# Patient Record
Sex: MALE | Race: WHITE | Employment: UNEMPLOYED | URBAN - METROPOLITAN AREA
[De-identification: names, ages, dates, MRNs, and addresses within clinical notes are randomized per-mention and may not be internally consistent; named-entity substitution may affect disease eponyms.]

---

## 2019-08-13 ENCOUNTER — APPOINTMENT (OUTPATIENT)
Dept: CT IMAGING | Age: 79
DRG: 083 | End: 2019-08-13
Payer: MEDICARE

## 2019-08-13 ENCOUNTER — HOSPITAL ENCOUNTER (INPATIENT)
Age: 79
LOS: 2 days | Discharge: HOME | DRG: 083 | End: 2019-08-15
Attending: EMERGENCY MEDICINE | Admitting: SURGERY
Payer: MEDICARE

## 2019-08-13 DIAGNOSIS — I60.9 SUBARACHNOID BLEED (HCC): Primary | ICD-10-CM

## 2019-08-13 LAB
ABO/RH: NORMAL
ALLEN TEST: ABNORMAL
ALLEN TEST: NORMAL
ANION GAP SERPL CALCULATED.3IONS-SCNC: 16 MMOL/L (ref 9–17)
ANTIBODY SCREEN: NEGATIVE
ARM BAND NUMBER: NORMAL
BLOOD BANK SPECIMEN: ABNORMAL
BUN BLDV-MCNC: 19 MG/DL (ref 8–23)
CARBOXYHEMOGLOBIN: 1.2 % (ref 0–5)
CARBOXYHEMOGLOBIN: ABNORMAL %
CHLORIDE BLD-SCNC: 102 MMOL/L (ref 98–107)
CO2: 24 MMOL/L (ref 20–31)
CREAT SERPL-MCNC: 1.08 MG/DL (ref 0.7–1.2)
ETHANOL PERCENT: <0.01 %
ETHANOL: <10 MG/DL
EXPIRATION DATE: NORMAL
FIO2: ABNORMAL
FIO2: NORMAL
GFR AFRICAN AMERICAN: >60 ML/MIN
GFR NON-AFRICAN AMERICAN: >60 ML/MIN
GFR SERPL CREATININE-BSD FRML MDRD: ABNORMAL ML/MIN/{1.73_M2}
GFR SERPL CREATININE-BSD FRML MDRD: ABNORMAL ML/MIN/{1.73_M2}
GLUCOSE BLD-MCNC: 97 MG/DL (ref 70–99)
HCG QUALITATIVE: ABNORMAL
HCO3 VENOUS: 24 MMOL/L (ref 24–30)
HCO3 VENOUS: ABNORMAL MMOL/L (ref 24–30)
HCT VFR BLD CALC: 36 % (ref 40.7–50.3)
HEMOGLOBIN: 11.6 G/DL (ref 13–17)
INR BLD: 0.9
MCH RBC QN AUTO: 32 PG (ref 25.2–33.5)
MCHC RBC AUTO-ENTMCNC: 32.2 G/DL (ref 28.4–34.8)
MCV RBC AUTO: 99.2 FL (ref 82.6–102.9)
METHEMOGLOBIN: ABNORMAL %
METHEMOGLOBIN: NORMAL % (ref 0–1.5)
MODE: ABNORMAL
MODE: NORMAL
NEGATIVE BASE EXCESS, VEN: 0.8 MMOL/L (ref 0–2)
NEGATIVE BASE EXCESS, VEN: ABNORMAL MMOL/L (ref 0–2)
NOTIFICATION TIME: ABNORMAL
NOTIFICATION TIME: NORMAL
NOTIFICATION: ABNORMAL
NOTIFICATION: NORMAL
NRBC AUTOMATED: 0 PER 100 WBC
O2 DEVICE/FLOW/%: ABNORMAL
O2 DEVICE/FLOW/%: NORMAL
O2 SAT, VEN: 78.3 % (ref 60–85)
O2 SAT, VEN: ABNORMAL %
OXYHEMOGLOBIN: ABNORMAL % (ref 95–98)
OXYHEMOGLOBIN: NORMAL % (ref 95–98)
PARTIAL THROMBOPLASTIN TIME: 22.5 SEC (ref 20.5–30.5)
PATIENT TEMP: 37
PATIENT TEMP: ABNORMAL
PCO2, VEN, TEMP ADJ: ABNORMAL MMHG (ref 39–55)
PCO2, VEN, TEMP ADJ: NORMAL MMHG (ref 39–55)
PCO2, VEN: 42.7 (ref 39–55)
PCO2, VEN: ABNORMAL (ref 39–55)
PDW BLD-RTO: 15 % (ref 11.8–14.4)
PEEP/CPAP: ABNORMAL
PEEP/CPAP: NORMAL
PH VENOUS: 7.37 (ref 7.32–7.42)
PH VENOUS: ABNORMAL (ref 7.32–7.42)
PH, VEN, TEMP ADJ: ABNORMAL (ref 7.32–7.42)
PH, VEN, TEMP ADJ: NORMAL (ref 7.32–7.42)
PLATELET # BLD: 186 K/UL (ref 138–453)
PMV BLD AUTO: 9.6 FL (ref 8.1–13.5)
PO2, VEN, TEMP ADJ: ABNORMAL MMHG (ref 30–50)
PO2, VEN, TEMP ADJ: NORMAL MMHG (ref 30–50)
PO2, VEN: 42.9 (ref 30–50)
PO2, VEN: ABNORMAL (ref 30–50)
POSITIVE BASE EXCESS, VEN: ABNORMAL MMOL/L (ref 0–2)
POSITIVE BASE EXCESS, VEN: NORMAL MMOL/L (ref 0–2)
POTASSIUM SERPL-SCNC: 4.4 MMOL/L (ref 3.7–5.3)
PROTHROMBIN TIME: 10.1 SEC (ref 9–12)
PSV: ABNORMAL
PSV: NORMAL
PT. POSITION: ABNORMAL
PT. POSITION: NORMAL
RBC # BLD: 3.63 M/UL (ref 4.21–5.77)
RESPIRATORY RATE: ABNORMAL
RESPIRATORY RATE: NORMAL
SAMPLE SITE: ABNORMAL
SAMPLE SITE: NORMAL
SET RATE: ABNORMAL
SET RATE: NORMAL
SODIUM BLD-SCNC: 142 MMOL/L (ref 135–144)
TEXT FOR RESPIRATORY: ABNORMAL
TEXT FOR RESPIRATORY: NORMAL
TOTAL HB: ABNORMAL G/DL (ref 12–16)
TOTAL HB: NORMAL G/DL (ref 12–16)
TOTAL RATE: ABNORMAL
TOTAL RATE: NORMAL
VT: ABNORMAL
VT: NORMAL
WBC # BLD: 5.8 K/UL (ref 3.5–11.3)

## 2019-08-13 PROCEDURE — 86900 BLOOD TYPING SEROLOGIC ABO: CPT

## 2019-08-13 PROCEDURE — 6370000000 HC RX 637 (ALT 250 FOR IP): Performed by: STUDENT IN AN ORGANIZED HEALTH CARE EDUCATION/TRAINING PROGRAM

## 2019-08-13 PROCEDURE — 82805 BLOOD GASES W/O2 SATURATION: CPT

## 2019-08-13 PROCEDURE — 82947 ASSAY GLUCOSE BLOOD QUANT: CPT

## 2019-08-13 PROCEDURE — 80051 ELECTROLYTE PANEL: CPT

## 2019-08-13 PROCEDURE — 99285 EMERGENCY DEPT VISIT HI MDM: CPT

## 2019-08-13 PROCEDURE — 85610 PROTHROMBIN TIME: CPT

## 2019-08-13 PROCEDURE — 85576 BLOOD PLATELET AGGREGATION: CPT

## 2019-08-13 PROCEDURE — 84520 ASSAY OF UREA NITROGEN: CPT

## 2019-08-13 PROCEDURE — 86850 RBC ANTIBODY SCREEN: CPT

## 2019-08-13 PROCEDURE — 87641 MR-STAPH DNA AMP PROBE: CPT

## 2019-08-13 PROCEDURE — 93005 ELECTROCARDIOGRAM TRACING: CPT | Performed by: STUDENT IN AN ORGANIZED HEALTH CARE EDUCATION/TRAINING PROGRAM

## 2019-08-13 PROCEDURE — 85027 COMPLETE CBC AUTOMATED: CPT

## 2019-08-13 PROCEDURE — 86901 BLOOD TYPING SEROLOGIC RH(D): CPT

## 2019-08-13 PROCEDURE — 36415 COLL VENOUS BLD VENIPUNCTURE: CPT

## 2019-08-13 PROCEDURE — 2580000003 HC RX 258: Performed by: STUDENT IN AN ORGANIZED HEALTH CARE EDUCATION/TRAINING PROGRAM

## 2019-08-13 PROCEDURE — 70496 CT ANGIOGRAPHY HEAD: CPT

## 2019-08-13 PROCEDURE — 84703 CHORIONIC GONADOTROPIN ASSAY: CPT

## 2019-08-13 PROCEDURE — 85210 CLOT FACTOR II PROTHROM SPEC: CPT

## 2019-08-13 PROCEDURE — 82565 ASSAY OF CREATININE: CPT

## 2019-08-13 PROCEDURE — G0480 DRUG TEST DEF 1-7 CLASSES: HCPCS

## 2019-08-13 PROCEDURE — 6360000004 HC RX CONTRAST MEDICATION: Performed by: SURGERY

## 2019-08-13 PROCEDURE — 85390 FIBRINOLYSINS SCREEN I&R: CPT

## 2019-08-13 PROCEDURE — 2000000000 HC ICU R&B

## 2019-08-13 PROCEDURE — 85730 THROMBOPLASTIN TIME PARTIAL: CPT

## 2019-08-13 RX ORDER — POLYETHYLENE GLYCOL 3350 17 G/17G
17 POWDER, FOR SOLUTION ORAL DAILY
Status: DISCONTINUED | OUTPATIENT
Start: 2019-08-14 | End: 2019-08-15 | Stop reason: HOSPADM

## 2019-08-13 RX ORDER — ACETAMINOPHEN 500 MG
1000 TABLET ORAL EVERY 8 HOURS SCHEDULED
Status: DISCONTINUED | OUTPATIENT
Start: 2019-08-13 | End: 2019-08-15 | Stop reason: HOSPADM

## 2019-08-13 RX ORDER — SODIUM CHLORIDE 9 MG/ML
INJECTION, SOLUTION INTRAVENOUS CONTINUOUS
Status: DISCONTINUED | OUTPATIENT
Start: 2019-08-13 | End: 2019-08-14

## 2019-08-13 RX ORDER — LABETALOL HYDROCHLORIDE 5 MG/ML
10 INJECTION, SOLUTION INTRAVENOUS EVERY 6 HOURS PRN
Status: DISCONTINUED | OUTPATIENT
Start: 2019-08-13 | End: 2019-08-14

## 2019-08-13 RX ORDER — FENTANYL CITRATE 50 UG/ML
25 INJECTION, SOLUTION INTRAMUSCULAR; INTRAVENOUS
Status: DISCONTINUED | OUTPATIENT
Start: 2019-08-13 | End: 2019-08-14

## 2019-08-13 RX ORDER — DOCUSATE SODIUM 100 MG/1
100 CAPSULE, LIQUID FILLED ORAL DAILY
Status: DISCONTINUED | OUTPATIENT
Start: 2019-08-14 | End: 2019-08-15 | Stop reason: HOSPADM

## 2019-08-13 RX ORDER — ONDANSETRON 2 MG/ML
4 INJECTION INTRAMUSCULAR; INTRAVENOUS EVERY 6 HOURS PRN
Status: DISCONTINUED | OUTPATIENT
Start: 2019-08-13 | End: 2019-08-14

## 2019-08-13 RX ORDER — SODIUM CHLORIDE 0.9 % (FLUSH) 0.9 %
10 SYRINGE (ML) INJECTION PRN
Status: DISCONTINUED | OUTPATIENT
Start: 2019-08-13 | End: 2019-08-15 | Stop reason: HOSPADM

## 2019-08-13 RX ORDER — METHOCARBAMOL 500 MG/1
500 TABLET, FILM COATED ORAL EVERY 8 HOURS
Status: DISCONTINUED | OUTPATIENT
Start: 2019-08-13 | End: 2019-08-15 | Stop reason: HOSPADM

## 2019-08-13 RX ORDER — OXYCODONE HYDROCHLORIDE 5 MG/1
5 TABLET ORAL EVERY 4 HOURS PRN
Status: DISCONTINUED | OUTPATIENT
Start: 2019-08-13 | End: 2019-08-14

## 2019-08-13 RX ORDER — SODIUM CHLORIDE 0.9 % (FLUSH) 0.9 %
10 SYRINGE (ML) INJECTION EVERY 12 HOURS SCHEDULED
Status: DISCONTINUED | OUTPATIENT
Start: 2019-08-13 | End: 2019-08-15 | Stop reason: HOSPADM

## 2019-08-13 RX ADMIN — ACETAMINOPHEN 1000 MG: 500 TABLET ORAL at 22:28

## 2019-08-13 RX ADMIN — Medication 10 ML: at 22:29

## 2019-08-13 RX ADMIN — SODIUM CHLORIDE: 9 INJECTION, SOLUTION INTRAVENOUS at 22:28

## 2019-08-13 RX ADMIN — IOHEXOL 90 ML: 350 INJECTION, SOLUTION INTRAVENOUS at 20:23

## 2019-08-13 ASSESSMENT — PAIN SCALES - GENERAL
PAINLEVEL_OUTOF10: 0
PAINLEVEL_OUTOF10: 7
PAINLEVEL_OUTOF10: 0

## 2019-08-13 ASSESSMENT — PAIN DESCRIPTION - ORIENTATION: ORIENTATION: ANTERIOR

## 2019-08-13 ASSESSMENT — PAIN DESCRIPTION - PAIN TYPE: TYPE: ACUTE PAIN

## 2019-08-13 ASSESSMENT — PAIN DESCRIPTION - DESCRIPTORS: DESCRIPTORS: ACHING;HEADACHE

## 2019-08-13 ASSESSMENT — PAIN DESCRIPTION - LOCATION: LOCATION: HEAD

## 2019-08-13 NOTE — CONSULTS
shift. Trace edema present. No evidence of hydrocephalus or intraventricular bleed. Pt is neuro intact w/o deficits and is not on AC. Patient care will be discussed with attending, will reevaluate patient along with attending    -Pt denies neck or back pain, no MLT on exam and when rolling pt no obvious deformities or evidence of injury   - No neurosurgical interventions planned for now  - CTLS recommendations: clear Pt has no acute evdience fx on CT c spine, there is a 4 mm posterior subluxation however this is 2/2 deg. Disc disease. Old T3/T4 compression fx. Pt has no MLT of C or T spine, no neuro deficits  - HOB: 30 degrees    - Obtain CT head in AM   -coag, platelet function test   - Neuro checks per protocol  - Hold all antiplatelets and anticoagulants  - We recommend SBP < 160   - Determine the lower limit of SBP clinically based on mentation      Additional recommendations may follow    Please contact neurosurgery with any changes in patient's neurologic status. Thank you for your consult.        Zenia Rain DO    PGY-2 Emergency Medicine Resident Physician  Neurosurgery Team - pager [phone] [facility]  8/13/2019 7:38 PM

## 2019-08-13 NOTE — H&P
TRAUMA HISTORY AND PHYSICAL EXAMINATION  (V 2.0)    PATIENT NAME: Luis Armando Ford  YOB: 1940  MEDICAL RECORD NO. [id number]   DATE: 8/13/2019  PRIMARY CARE PHYSICIAN: No primary care provider on file. PATIENT EVALUATED AT THE REQUEST OF :   Keegan Prieto    ACTIVATION   []Trauma Alert     [x] Trauma Priority     []Trauma Consult. IMPRESSION:     Patient Active Problem List   Diagnosis    SAH (subarachnoid hemorrhage) ([facility] [address]     · 78 y.o M s/p fall with [facility]    MEDICAL DECISION MAKING AND PLAN:     · Admit to ICU under Trauma service. 1. Neurological  1. Pain control - tylenol, robaxin, fentanyl PRN  2. CTLS - neurosurgery to clear spine  3. SAH  1. F/u NS recs  2. Cardiovascular  1. Monitor HR and BP  2. Keep BP <160  3. Home meds not resumed yet - need to clarify with pharmacy  3. Respiratory  1. Monitor O2 sat  2. Wean O2 as necessary   4. Gastrointestinal  1. Keep NPO except sips with meds  5. FEN/Renal  1. IVF - NS @ 120cc/hr  2. F/u BMP in AM  6. Heme/ID  1. Afebrile  2. F/u CBC in AM  7. MSK  1. TERT in AM      CONSULT SERVICES    [x] Neurosurgery     [] Orthopedic Surgery    [] Cardiothoracic     [] Facial Trauma    [] Plastic Surgery (Burn)    [] Pediatric Surgery     [] Internal Medicine    [] Pulmonary Medicine    [] Other:       HISTORY:     SOURCE OF INFORMATION  Patient information was obtained from patient. History/Exam limitations: none. INJURY SUMMARY  SAH    GENERAL DATA  Age 78 y.o.  male   Patient information was obtained from patient. History/Exam limitations: none.    Patient presented to the Emergency Department by ambulance   Injury Date: 8/13/2019   Approximate Injury Time: 1800       Transport mode:   [x]Ambulance      [] Helicopter     []Car       [] Other  Referring Hospital: [place] [address], (e.g., home, farm, industry, street)  Specific Details of Location (e.g., bedroom, kitchen, garage): outside of a [address]

## 2019-08-13 NOTE — ED PROVIDER NOTES
[address] [facility]  ED  Emergency Department Encounter  Emergency Medicine Resident     Pt Name: Sarahy Schneider  MRN: [id number]  Juliusgfnicko 1940  Date of evaluation: 8/13/19  PCP:  No primary care provider on file. CHIEF COMPLAINT       Fall      HISTORY OFPRESENT ILLNESS  (Location/Symptom, Timing/Onset, Context/Setting, Quality, Duration, Modifying Factors,Severity.)      Sulaiman Ford is a 78year old male who presents as a transfer from [facility] facility after a fall. The patient's CT imaging showed subarachnoid hemorrhage and the patient was transferred for neurosurgery evaluation. Patient reports that he tripped on a curve and fell backwards hitting his head. The patient believes he lost consciousness but not know for how long. Patient is not currently taking any blood thinners. PAST MEDICAL / SURGICAL / SOCIAL / FAMILY HISTORY      has no past medical history on file. has no past surgical history on file.      Social History     Socioeconomic History    Marital status:      Spouse name: Not on file    Number of children: Not on file    Years of education: Not on file    Highest education level: Not on file   Occupational History    Not on file   Social Needs    Financial resource strain: Not on file    Food insecurity:     Worry: Not on file     Inability: Not on file    Transportation needs:     Medical: Not on file     Non-medical: Not on file   Tobacco Use    Smoking status: Not on file   Substance and Sexual Activity    Alcohol use: Not on file    Drug use: Not on file    Sexual activity: Not on file   Lifestyle    Physical activity:     Days per week: Not on file     Minutes per session: Not on file    Stress: Not on file   Relationships    Social connections:     Talks on phone: Not on file     Gets together: Not on file     Attends Gnosticist service: Not on file     Active member of club or organization: Not on file     Attends meetings of clubs or

## 2019-08-14 ENCOUNTER — APPOINTMENT (OUTPATIENT)
Dept: CT IMAGING | Age: 79
DRG: 083 | End: 2019-08-14
Payer: MEDICARE

## 2019-08-14 ENCOUNTER — APPOINTMENT (OUTPATIENT)
Dept: GENERAL RADIOLOGY | Age: 79
DRG: 083 | End: 2019-08-14
Payer: MEDICARE

## 2019-08-14 LAB
ACT TEG: 113 SEC (ref 86–118)
ANGLE, RAPID TEG: 78.7 DEG (ref 64–80)
ANION GAP SERPL CALCULATED.3IONS-SCNC: 14 MMOL/L (ref 9–17)
BUN BLDV-MCNC: 17 MG/DL (ref 8–23)
BUN/CREAT BLD: NORMAL (ref 9–20)
CALCIUM SERPL-MCNC: 8.6 MG/DL (ref 8.6–10.4)
CHLORIDE BLD-SCNC: 103 MMOL/L (ref 98–107)
CO2: 22 MMOL/L (ref 20–31)
COLLAGEN ADENOSINE-5'-DIPHOSPHATE (ADP) TIME: 97 SEC (ref 67–112)
COLLAGEN EPINEPHRINE TIME: 109 SEC (ref 85–172)
CREAT SERPL-MCNC: 1.11 MG/DL (ref 0.7–1.2)
EKG ATRIAL RATE: 100 BPM
EKG P AXIS: 12 DEGREES
EKG P-R INTERVAL: 134 MS
EKG Q-T INTERVAL: 348 MS
EKG QRS DURATION: 82 MS
EKG QTC CALCULATION (BAZETT): 448 MS
EKG R AXIS: 30 DEGREES
EKG T AXIS: 33 DEGREES
EKG VENTRICULAR RATE: 100 BPM
EPL-TEG: 0 % (ref 0–15)
GFR AFRICAN AMERICAN: >60 ML/MIN
GFR NON-AFRICAN AMERICAN: >60 ML/MIN
GFR SERPL CREATININE-BSD FRML MDRD: NORMAL ML/MIN/{1.73_M2}
GFR SERPL CREATININE-BSD FRML MDRD: NORMAL ML/MIN/{1.73_M2}
GLUCOSE BLD-MCNC: 97 MG/DL (ref 70–99)
HCT VFR BLD CALC: 31.9 % (ref 40.7–50.3)
HEMOGLOBIN: 10.2 G/DL (ref 13–17)
HEPARIN THERAPY: ABNORMAL
KINETICS RAPID TEG: 0.9 MIN (ref 1–2)
LY30 (LYSIS) TEG: 0 % (ref 0–8)
MA(MAX CLOT) RAPID TEG: 69.1 MM (ref 52–71)
MAGNESIUM: 2.3 MG/DL (ref 1.6–2.6)
MCH RBC QN AUTO: 32.5 PG (ref 25.2–33.5)
MCHC RBC AUTO-ENTMCNC: 32 G/DL (ref 28.4–34.8)
MCV RBC AUTO: 101.6 FL (ref 82.6–102.9)
MRSA, DNA, NASAL: NORMAL
NRBC AUTOMATED: 0 PER 100 WBC
PDW BLD-RTO: 15.1 % (ref 11.8–14.4)
PHOSPHORUS: 3.8 MG/DL (ref 2.5–4.5)
PLATELET # BLD: 175 K/UL (ref 138–453)
PLATELET FUNCTION INTERP: NORMAL
PMV BLD AUTO: 9.6 FL (ref 8.1–13.5)
POTASSIUM SERPL-SCNC: 4.1 MMOL/L (ref 3.7–5.3)
RBC # BLD: 3.14 M/UL (ref 4.21–5.77)
REACTION TIME RAPID TEG: 0.7 MIN (ref 0–1)
SODIUM BLD-SCNC: 139 MMOL/L (ref 135–144)
SPECIMEN DESCRIPTION: NORMAL
TEG COMMENT: ABNORMAL
WBC # BLD: 5 K/UL (ref 3.5–11.3)

## 2019-08-14 PROCEDURE — 70450 CT HEAD/BRAIN W/O DYE: CPT

## 2019-08-14 PROCEDURE — 6370000000 HC RX 637 (ALT 250 FOR IP): Performed by: STUDENT IN AN ORGANIZED HEALTH CARE EDUCATION/TRAINING PROGRAM

## 2019-08-14 PROCEDURE — 80048 BASIC METABOLIC PNL TOTAL CA: CPT

## 2019-08-14 PROCEDURE — 84100 ASSAY OF PHOSPHORUS: CPT

## 2019-08-14 PROCEDURE — 97535 SELF CARE MNGMENT TRAINING: CPT

## 2019-08-14 PROCEDURE — 71045 X-RAY EXAM CHEST 1 VIEW: CPT

## 2019-08-14 PROCEDURE — 2580000003 HC RX 258: Performed by: STUDENT IN AN ORGANIZED HEALTH CARE EDUCATION/TRAINING PROGRAM

## 2019-08-14 PROCEDURE — 97166 OT EVAL MOD COMPLEX 45 MIN: CPT

## 2019-08-14 PROCEDURE — 36415 COLL VENOUS BLD VENIPUNCTURE: CPT

## 2019-08-14 PROCEDURE — 83735 ASSAY OF MAGNESIUM: CPT

## 2019-08-14 PROCEDURE — 6370000000 HC RX 637 (ALT 250 FOR IP): Performed by: NURSE PRACTITIONER

## 2019-08-14 PROCEDURE — 92523 SPEECH SOUND LANG COMPREHEN: CPT

## 2019-08-14 PROCEDURE — 6360000002 HC RX W HCPCS: Performed by: STUDENT IN AN ORGANIZED HEALTH CARE EDUCATION/TRAINING PROGRAM

## 2019-08-14 PROCEDURE — 93010 ELECTROCARDIOGRAM REPORT: CPT | Performed by: INTERNAL MEDICINE

## 2019-08-14 PROCEDURE — 97162 PT EVAL MOD COMPLEX 30 MIN: CPT

## 2019-08-14 PROCEDURE — 99221 1ST HOSP IP/OBS SF/LOW 40: CPT | Performed by: NEUROLOGICAL SURGERY

## 2019-08-14 PROCEDURE — 72170 X-RAY EXAM OF PELVIS: CPT

## 2019-08-14 PROCEDURE — 2060000000 HC ICU INTERMEDIATE R&B

## 2019-08-14 PROCEDURE — 97530 THERAPEUTIC ACTIVITIES: CPT

## 2019-08-14 PROCEDURE — 85027 COMPLETE CBC AUTOMATED: CPT

## 2019-08-14 RX ORDER — LATANOPROST 50 UG/ML
1 SOLUTION/ DROPS OPHTHALMIC NIGHTLY
Status: DISCONTINUED | OUTPATIENT
Start: 2019-08-14 | End: 2019-08-15 | Stop reason: HOSPADM

## 2019-08-14 RX ORDER — ALPRAZOLAM 0.5 MG/1
0.5 TABLET ORAL 2 TIMES DAILY PRN
Status: DISCONTINUED | OUTPATIENT
Start: 2019-08-14 | End: 2019-08-15 | Stop reason: HOSPADM

## 2019-08-14 RX ADMIN — Medication 10 ML: at 08:49

## 2019-08-14 RX ADMIN — FENTANYL CITRATE 25 MCG: 50 INJECTION, SOLUTION INTRAMUSCULAR; INTRAVENOUS at 02:16

## 2019-08-14 RX ADMIN — ACETAMINOPHEN 1000 MG: 500 TABLET ORAL at 06:01

## 2019-08-14 RX ADMIN — Medication 10 ML: at 19:50

## 2019-08-14 RX ADMIN — ACETAMINOPHEN 1000 MG: 500 TABLET ORAL at 13:39

## 2019-08-14 RX ADMIN — PAROXETINE HYDROCHLORIDE HEMIHYDRATE 30 MG: 20 TABLET, FILM COATED ORAL at 15:47

## 2019-08-14 RX ADMIN — METHOCARBAMOL TABLETS 500 MG: 500 TABLET, COATED ORAL at 00:02

## 2019-08-14 RX ADMIN — METHOCARBAMOL TABLETS 500 MG: 500 TABLET, COATED ORAL at 23:00

## 2019-08-14 RX ADMIN — LATANOPROST 1 DROP: 50 SOLUTION OPHTHALMIC at 19:50

## 2019-08-14 RX ADMIN — ACETAMINOPHEN 1000 MG: 500 TABLET ORAL at 23:00

## 2019-08-14 RX ADMIN — POLYETHYLENE GLYCOL 3350 17 G: 17 POWDER, FOR SOLUTION ORAL at 08:48

## 2019-08-14 RX ADMIN — METHOCARBAMOL TABLETS 500 MG: 500 TABLET, COATED ORAL at 08:48

## 2019-08-14 RX ADMIN — METHOCARBAMOL TABLETS 500 MG: 500 TABLET, COATED ORAL at 15:47

## 2019-08-14 ASSESSMENT — PAIN DESCRIPTION - DESCRIPTORS
DESCRIPTORS: HEADACHE

## 2019-08-14 ASSESSMENT — PAIN SCALES - GENERAL
PAINLEVEL_OUTOF10: 3
PAINLEVEL_OUTOF10: 0
PAINLEVEL_OUTOF10: 4
PAINLEVEL_OUTOF10: 4
PAINLEVEL_OUTOF10: 0
PAINLEVEL_OUTOF10: 0
PAINLEVEL_OUTOF10: 4
PAINLEVEL_OUTOF10: 0
PAINLEVEL_OUTOF10: 7
PAINLEVEL_OUTOF10: 2
PAINLEVEL_OUTOF10: 3

## 2019-08-14 ASSESSMENT — PAIN DESCRIPTION - PAIN TYPE
TYPE: ACUTE PAIN

## 2019-08-14 ASSESSMENT — PAIN DESCRIPTION - ORIENTATION
ORIENTATION: ANTERIOR
ORIENTATION: ANTERIOR

## 2019-08-14 ASSESSMENT — PAIN DESCRIPTION - LOCATION
LOCATION: HEAD

## 2019-08-14 NOTE — PROGRESS NOTES
Speech Language Pathology  Facility/Department[facility]  Initial Speech/Language/Cognitive Assessment    NAME: Sheridan Telles  : 1940   MRN: [id number]  ADMISSION DATE: 2019  ADMITTING DIAGNOSIS: has SAH (subarachnoid hemorrhage) ([address] on their problem list.    Date of Eval: 2019   Evaluating Therapist: Nilo Mendez    Primary Complaint: 78 y.o M s/p fall after he tripped on the curb. Jemma Leas back and hit his head on the cement. Taken to [employer]. CTH with subarachnoid hemorrhage. Hemodynamically stable. Complaints of some back pain. Not on any anticoagulation. Pain:  Pain Assessment  Pain Assessment: Faces  Pain Level: 4    Assessment:  Pt presents with moderate  cognitive deficits characterized by impaired delayed recall of 3 units, immediate recall of 5 units, and difficulty with word associations and word deductions. Pt with baseline articulation disorder. ST to follow up and provide treatment to address noted deficits. Verbal education provided. Recommendations:  Requires SLP Intervention: Yes  Duration/Frequency of Treatment: 3-5x a week  D/C Recommendations: Further therapy recommended at discharge. Plan:   Goals:  Short-term Goals  Goal 1: Pt will recall 3-5 units with and without distractions with 90% accuracy  Goal 2: Pt will utilize memory compensatory strategies to aid in recall   Goal 3: Pt will complete word deductions with 90% accuracy  Goal 4: Pt will complete word associations with 90% accuracy   Patient/family involved in developing goals and treatment plan: yes    Subjective:   Previous level of function and limitations:   General  Chart Reviewed: Yes  Family / Caregiver Present: No  Social/Functional History  Lives With: Spouse  Active : Yes  Occupation: Retired  Vision  Vision: Within Functional Limits  Hearing  Hearing: Within functional limits     Objective:  Oral/Motor  Oral Motor:  Within functional limits    Expression  Primary Mode of Expression: Verbal    Motor Speech  Motor Speech: Within Functional Limits    Cognition:   Orientation  Overall Orientation Status: Within Normal Limits  Memory  Memory: Exceptions to [facility]  Short-term Memory: Mild(Delayed recall 3 units, 2/3, 2/3)  Working Memory: Mild(Immediate recall, 3 units: 3/3, 3/3 5 units: 3/5)  Problem Solving  Problem Solving: Exceptions to [facility]  Verbal Reasoning Skills: Mild(Word Deductions: 1/5)  Abstract Reasoning  Abstract Reasoning: Within Functional Limits  Safety/Judgement  Safety/Judgement: Within Functional Limits  Verbal Sequencing: [facility]  Word Associations:  Moderate (2/4)     Prognosis:  Speech Therapy Prognosis  Prognosis: Good  Individuals consulted  Consulted and agree with results and recommendations: Patient    Education:  Patient Education: yes  Patient Education Response: Verbalizes understanding        Therapy Time:   Individual Concurrent Group Co-treatment   Time In 5790         Time Out 1930         Minutes 15                 Completed by Nevaeh Landry,  Clinician    Co-signed by Christa Wilkinson M.S., CCC/SLP    8/14/2019 11:30 AM

## 2019-08-14 NOTE — PROGRESS NOTES
goals  Time Frame for Short term goals: 14 visits  Short term goal 1: Perform bed mobility and functional transfers independently  Short term goal 2: Ambulate 600ft independently  Short term goal 3: Ascend/descend 8\" step with unilateral UE support independently to simulate truck transfer  Short term goal 4: Demo Good- dynamic standing balance to decrease risk of falls       Therapy Time   Individual Concurrent Group Co-treatment   Time In 0933         Time Out 1003         Minutes 30         Timed Code Treatment Minutes: 10 Minutes       Deloris Singh, PT

## 2019-08-14 NOTE — PROGRESS NOTES
turbinates normal without polyps  Neck: supple and non-tender without mass, no thyromegaly or thyroid nodules, no cervical lymphadenopathy  Pulmonary/Chest: Equal breath sounds b/l  Cardiovascular: S1S2  Abdomen: soft, non-tender, non-distended  Pelvis:  Stable  Back:  No abrasions/lesions. No obvious deformities. No TTP. No step-offs  Rectal: Sphincter tone intact, No gross blood  Extremities: palpable radial, femoral, and pedal pulses b/l  Musculoskeletal: normal range of motion, no joint swelling, deformity or tenderness  Neurologic: reflexes normal and symmetric, no cranial nerve deficit, gait, coordination and speech normal      Drain/tube output:      LAB:  CBC:   Recent Labs     08/13/19  2307 08/14/19  0430   WBC 5.8 5.0   HGB 11.6* 10.2*   HCT 36.0* 31.9*   MCV 99.2 101.6    175     BMP:   Recent Labs     08/13/19  2307 08/14/19  0430    139   K 4.4 4.1    103   CO2 24 22   BUN 19 17   CREATININE 1.08 1.11   GLUCOSE 97 97         RADIOLOGY:  CXR: n/a      Neo Rodriguez DO  8/14/19, 7:57 PM      Trauma Attending Attestation      I have reviewed the above TECSS note(s) and confirmed the key elements of the medical history and physical exam. I have seen and examined the pt. I have discussed the findings, established the care plan and recommendations with Resident, GCS RN, bedside nurse. I personally reviewed any images in real time to expedite the patient's care. I spent 30 minutes with the patient performing critical care duties.       Faviola Mark MD  8/14/2019  11:21 PM

## 2019-08-14 NOTE — PLAN OF CARE
Problem: Risk for Impaired Skin Integrity  Goal: Tissue integrity - skin and mucous membranes  Description  Structural intactness and normal physiological function of skin and  mucous membranes.   Outcome: Ongoing     Problem: Falls - Risk of:  Goal: Will remain free from falls  Description  Will remain free from falls  Outcome: Ongoing  Goal: Absence of physical injury  Description  Absence of physical injury  Outcome: Ongoing     Problem: Pain:  Goal: Pain level will decrease  Description  Pain level will decrease  Outcome: Ongoing  Goal: Control of acute pain  Description  Control of acute pain  Outcome: Ongoing

## 2019-08-14 NOTE — PROGRESS NOTES
Inpatient CMS G-Code Modifier : CK (08/14/19 3989)    Goals  Short term goals  Time Frame for Short term goals: Patient will, by discharge  Short term goal 1: demonstrate UB self-cares at Mod I using good safety awareness  Short term goal 2: demonstrate LB self-cares at Mod I using good safety awareness  Short term goal 3: demonstrate functional mobility/transfers at Mod I using LRD and good safety awareness  Short term goal 4: demonstrate ~25 minutes of functional activty tolerance to engage in ADL/IADL tasks   Short term goal 5: demonstrate tolieting at Mod I using good safety awareness with all aspects of task        Therapy Time   Individual Concurrent Group Co-treatment   Time In 0933         Time Out 1002         Minutes 29                 Eudelia Divers, OTR/L

## 2019-08-14 NOTE — PROGRESS NOTES
[facility]  [address]    Neurosurgery Service  Resident Daily Progress Note   8/14/2019 6:21 PM     Subjective    No acute events overnight. No new complaints. Objective    Vitals:    08/14/19 1500 08/14/19 1600 08/14/19 1700 08/14/19 1800   BP: 131/72 127/79 122/85 121/75   Pulse: 104 98 115 115   Resp: 26 27 29 24   Temp:  98.2 °F (36.8 °C)     TempSrc:  Oral     SpO2: 91% 93% 96% 91%   Weight:       Height:           Physical Exam:  Gen: Resting comfortably, no acute distress  CV: RRR  Resp: CTAB  GI: Abdomen soft, non-tender  Skin: Cap refill< 2 seconds  Neuro:    A&Ox3   PERRL, EOMI   CNII-XII intact   Normal speech and mentation  No focal sensory or motor deficits    Labs:  Lab Results   Component Value Date    WBC 5.0 08/14/2019    HGB 10.2 (L) 08/14/2019    HCT 31.9 (L) 08/14/2019     08/14/2019     08/14/2019    K 4.1 08/14/2019     08/14/2019    CREATININE 1.11 08/14/2019    BUN 17 08/14/2019    CO2 22 08/14/2019    INR 0.9 08/13/2019       Radiology (last 24 hours):  Reviewed    ASSESSMENT & PLAN:    Rosy Ford is a 78 y.o. male who presents with SDH, SAH. Pt is neuro intact w/o deficits and is not on AC. Labs and imaging were reviewed with Dr. Destiney Chappell in AM   - No neurosurgical interventions planned for now   - CTLS recommendations: Clear  - HOB: 30 degrees   - Hold all antiplatelets and anticoagulants  - We recommend SBP < 160  - Neuro checks per floor protocol  - Additional recommendations may follow      Please contact neurosurgery with any changes in patient's neurologic status.       Tania Sahu DO  PGY-2 Emergency Medicine Resident Physician  Neurosurgery/Neuro Critical Care Team  Pager [phone]

## 2019-08-15 ENCOUNTER — APPOINTMENT (OUTPATIENT)
Dept: CT IMAGING | Age: 79
DRG: 083 | End: 2019-08-15
Payer: MEDICARE

## 2019-08-15 VITALS
WEIGHT: 199.52 LBS | SYSTOLIC BLOOD PRESSURE: 131 MMHG | OXYGEN SATURATION: 91 % | HEIGHT: 67 IN | DIASTOLIC BLOOD PRESSURE: 82 MMHG | RESPIRATION RATE: 27 BRPM | HEART RATE: 104 BPM | BODY MASS INDEX: 31.31 KG/M2 | TEMPERATURE: 98.9 F

## 2019-08-15 PROCEDURE — 70450 CT HEAD/BRAIN W/O DYE: CPT

## 2019-08-15 PROCEDURE — 99231 SBSQ HOSP IP/OBS SF/LOW 25: CPT | Performed by: NEUROLOGICAL SURGERY

## 2019-08-15 PROCEDURE — 6370000000 HC RX 637 (ALT 250 FOR IP): Performed by: STUDENT IN AN ORGANIZED HEALTH CARE EDUCATION/TRAINING PROGRAM

## 2019-08-15 PROCEDURE — 2580000003 HC RX 258: Performed by: STUDENT IN AN ORGANIZED HEALTH CARE EDUCATION/TRAINING PROGRAM

## 2019-08-15 PROCEDURE — 97110 THERAPEUTIC EXERCISES: CPT

## 2019-08-15 PROCEDURE — 6370000000 HC RX 637 (ALT 250 FOR IP): Performed by: NURSE PRACTITIONER

## 2019-08-15 PROCEDURE — 99222 1ST HOSP IP/OBS MODERATE 55: CPT | Performed by: PHYSICAL MEDICINE & REHABILITATION

## 2019-08-15 PROCEDURE — 97530 THERAPEUTIC ACTIVITIES: CPT

## 2019-08-15 PROCEDURE — 94761 N-INVAS EAR/PLS OXIMETRY MLT: CPT

## 2019-08-15 PROCEDURE — 2700000000 HC OXYGEN THERAPY PER DAY

## 2019-08-15 PROCEDURE — 97127 HC SP THER IVNTJ W/FOCUS COG FUNCJ: CPT

## 2019-08-15 RX ORDER — ALPRAZOLAM 0.5 MG/1
0.5 TABLET ORAL 2 TIMES DAILY PRN
COMMUNITY

## 2019-08-15 RX ORDER — PAROXETINE HYDROCHLORIDE 37.5 MG/1
37.5 TABLET, FILM COATED, EXTENDED RELEASE ORAL 2 TIMES DAILY
COMMUNITY

## 2019-08-15 RX ORDER — LATANOPROST 50 UG/ML
1 SOLUTION/ DROPS OPHTHALMIC NIGHTLY
COMMUNITY

## 2019-08-15 RX ADMIN — ACETAMINOPHEN 1000 MG: 500 TABLET ORAL at 14:52

## 2019-08-15 RX ADMIN — PAROXETINE HYDROCHLORIDE HEMIHYDRATE 30 MG: 20 TABLET, FILM COATED ORAL at 08:36

## 2019-08-15 RX ADMIN — METHOCARBAMOL TABLETS 500 MG: 500 TABLET, COATED ORAL at 14:52

## 2019-08-15 RX ADMIN — Medication 10 ML: at 08:37

## 2019-08-15 RX ADMIN — ACETAMINOPHEN 1000 MG: 500 TABLET ORAL at 05:07

## 2019-08-15 RX ADMIN — METHOCARBAMOL TABLETS 500 MG: 500 TABLET, COATED ORAL at 08:36

## 2019-08-15 ASSESSMENT — PAIN SCALES - GENERAL
PAINLEVEL_OUTOF10: 0
PAINLEVEL_OUTOF10: 3
PAINLEVEL_OUTOF10: 4
PAINLEVEL_OUTOF10: 0

## 2019-08-15 NOTE — PROGRESS NOTES
absent normal   Left ankle absent absent normal   Right foot absent absent normal   Left foot absent absent normal           CONSULTS: neurosurgery    PROCEDURES: none    INJURIES:        Patient Active Problem List   Diagnosis    Subarachnoid bleed (HCC)         Assessment/Plan:     Continue current care  No new imaging needed at this time. Plan to transfer to step-down  Monitor neurologic status.   Dispo planning    Electronically signed by Nikhil Moran DO  on 8/15/2019 at 4:54 AM

## 2019-08-15 NOTE — PROGRESS NOTES
Exceptions  Following Commands: Follows multistep commands with repitition; Follows one step commands consistently  Attention Span: Attends with cues to redirect  Safety Judgement: Decreased awareness of need for safety;Decreased awareness of need for assistance  Insights: Decreased awareness of deficits  Objective   Bed mobility  Supine to Sit: Contact guard assistance  Sit to Supine: Contact guard assistance  Scooting: Contact guard assistance  Comment: Posterior lean noted upon standing independently correctable by pt. No dizziness/ lightheadedness noted while sitting EOB  Transfers  Sit to Stand: Contact guard assistance  Stand to sit: Contact guard assistance  Bed to Chair: Minimal assistance  Ambulation  Ambulation?: Yes  Ambulation 1  Surface: level tile  Device: No Device  Assistance: Contact guard assistance;Minimal assistance(Min A initially d/t LOB, improved balance noted with increased distance)  Gait Deviations: Staggers; Slow Violeta; Increased MELINDA  Distance: 150ft  Comments: Pt demonstrates slight reaching for objects in the hallway throughout ambulation. One LOB noted correct with min A from writer. Stairs/Curb  Stairs?: No(Pt declined trial of stairs this date)     Balance  Posture: Fair  Sitting - Static: Good  Sitting - Dynamic: Good;-  Standing - Static: Fair;+  Standing - Dynamic: Fair  Comments: standing balance assessed without AD    Seated LE exercise program: Long Arc Quads, hip abduction/adduction, heel/toe raises, and marches. Reps: x15 BLE  Comments: Pt's SpO2 >92% throughout performance of ther ex.        Goals  Short term goals  Time Frame for Short term goals: 14 visits  Short term goal 1: Perform bed mobility and functional transfers independently  Short term goal 2: Ambulate 600ft independently  Short term goal 3: Ascend/descend 8\" step with unilateral UE support independently to simulate truck transfer  Short term goal 4: Demo Good- dynamic standing balance to decrease risk of

## 2019-08-15 NOTE — CONSULTS
chloride flush 0.9 % injection 10 mL, 10 mL, Intravenous, 2 times per day  sodium chloride flush 0.9 % injection 10 mL, 10 mL, Intravenous, PRN  acetaminophen (TYLENOL) tablet 1,000 mg, 1,000 mg, Oral, 3 times per day  docusate sodium (COLACE) capsule 100 mg, 100 mg, Oral, Daily  polyethylene glycol (GLYCOLAX) packet 17 g, 17 g, Oral, Daily  methocarbamol (ROBAXIN) tablet 500 mg, 500 mg, Oral, Q8H    Social History:  Social History     Socioeconomic History    Marital status:      Spouse name: Not on file    Number of children: Not on file    Years of education: Not on file    Highest education level: Not on file   Occupational History    Not on file   Social Needs    Financial resource strain: Not on file    Food insecurity:     Worry: Not on file     Inability: Not on file    Transportation needs:     Medical: Not on file     Non-medical: Not on file   Tobacco Use    Smoking status: Not on file   Substance and Sexual Activity    Alcohol use: Not on file    Drug use: Not on file    Sexual activity: Not on file   Lifestyle    Physical activity:     Days per week: Not on file     Minutes per session: Not on file    Stress: Not on file   Relationships    Social connections:     Talks on phone: Not on file     Gets together: Not on file     Attends Gnosticism service: Not on file     Active member of club or organization: Not on file     Attends meetings of clubs or organizations: Not on file     Relationship status: Not on file    Intimate partner violence:     Fear of current or ex partner: Not on file     Emotionally abused: Not on file     Physically abused: Not on file     Forced sexual activity: Not on file   Other Topics Concern    Not on file   Social History Narrative    Not on file       Lives with:   wife  Home setup:   Steps into home ramp . Floors in home:  #1. Bed/bathroom on floor  1. Family History:   No family history on file.         Physical Exam:    BP (!) 151/82   Pulse speech eval, f/u outpt with rehab for tbi- rec cont PT/OT/SP  6. DVT proph: DVT prophylaxis as per trauma/neurosurgery-to be reevaluated 8/16    Discussed with pt and nsg    It was my pleasure to evaluate Aram Ford today. Please call with questions. Angie King. Radhames Joaquin MD          This note is created with the assistance of a speech recognition program.  While intending to generate a document that actually reflects the content of the visit, the document can still have some errors including those of syntax and sound a like substitutions which may escape proof reading.   In such instances, actual meaning can be extrapolated by contextual diversion

## 2019-08-15 NOTE — PLAN OF CARE
Full skin assessment completed this shift. No new skin breakdown at this time. Pt repositioned q2h and prn. Pt kept clean and dry. Gel mattress in place on bed, heels floated. Will continue to monitor. Pt assessed as a fall risk this shift. Pt remains free from falls at this time. Fall precautions in place, including falling star sign on door and fall risk band on pt. Floor free from obstacles, and bed is locked and in lowest position. Adequate lighting provided. Call light within reach; pt encouraged to call before getting OOB for any need. Bed alarm activated. Will continue to monitor needs during hourly rounding. Pt's pain assessed frequently with hourly rounding; assessed all pain characteristics including level, type, location, frequency, and onset. Pt medicated by RN per PRN orders. Non-pharmacologic interventions offered to pt as well. Pt states pain is tolerable at this time. Will continue to monitor.     Electronically signed by Yolanda Mccarthy RN on 8/15/2019 at 4:39 AM

## 2019-08-15 NOTE — CARE COORDINATION
Spoke with pt about discharge planning. He has Rx for OP PT/OT/speech. He states he will have transportation to therapy. He lives near [facility].  Son to transport home.     Discharge [facility] Case Management Department  Written by: Alona Machado RN    Patient Name: Heidi Prakash  Attending Provider: Sarah Gray MD  Admit Date: 2019  6:22 PM  MRN: [id number]  Account: [de-identified]                     : 1940  Discharge Date: 8/15      Disposition: home    Alona Machado RN

## 2019-08-15 NOTE — PROGRESS NOTES
ICU PROGRESS NOTE        PATIENT NAME: Ramu Gabriel  MEDICAL RECORD NO. [id number]  DATE: 8/15/2019    PRIMARY CARE PHYSICIAN: No primary care provider on file. HD: # 2    ASSESSMENT    Patient Active Problem List   Diagnosis    Subarachnoid bleed [facility])       MEDICAL DECISION MAKING AND PLAN    Neuro:  1. PADS Thearpy - Tylenol Robaxin,  takes xanax and paxil,   2. SDH repeat CTH stable  3. NS following recommending follow-up repeat head CT 8/15, CTLS clear, SBP < 160, hold all AC/antiplatelets    Pulmonology:  RA, IS,.maintain spO2 > 90%    Cardiovascular:  Obtaining home meds from pharmacy    GI/Diet:  Tolerating general diet  Bowel Regimen : Colace and Miralax     Renal/Fluids/Electrolytes:  1. U/O : 0.5cc/kg/hr by voids  2. Saline Locked tolerating p.o., no renal dysfunction  3. Electrolytes       Heme / ID / ENDO:   1. Acute blood loss anemia  No indication for transfusion Hb 10.2 (11.6)  Afebrile, no leukocytosis, No indication for antibotics  No glycemic issues    MSK  CTLS clear, okay OOB with assist  PT : continue @ home    LINES / PPX / CONSULTS   SCD, no AC until  day 2, will review AC status if still in hospital    DISPO  Follow-up neurosurgery recommendations post CT head this a.m., dispo planning      SUBJECTIVE    Patient seen and examined at bedside, no acute overnight events. Patient currently sitting in bed, complains of very mild headache pain 2 of 10, denies nausea vomiting or diarrhea, denies chest pain or shortness of breath. Patient states that he has no weakness numbness or tingling in the extremities. Patient is tolerating a general diet without issue, urinating well, and stooling.       OBJECTIVE  VITALS: Temp: Temp: 98.7 °F (37.1 °C)Temp  Av.5 °F (36.9 °C)  Min: 98.2 °F (36.8 °C)  Max: 99.3 °F (16.0 °C) BP Systolic (56HWC), CPI:672 , Min:121 , DUU:880   Diastolic (09UIM), OPJ:02, Min:72, Max:96   Pulse Pulse  Av.1  Min: 85  Max: 115 Resp Resp  Av.5  Min: 18

## 2019-08-19 NOTE — DISCHARGE SUMMARY
lymphadenopathy  Pulmonary/Chest: Equal breath sounds b/l  Cardiovascular: S1S2  Abdomen: soft, non-tender, non-distended  Extremities: palpable radial, femoral, and pedal pulses b/l  Musculoskeletal: normal range of motion, no joint swelling, deformity or tenderness  Neurologic: reflexes normal and symmetric, no cranial nerve deficit, gait, coordination and speech normal    LABS:   No results for input(s): WBC, HGB, HCT, PLT, NA, K, CL, CO2, BUN, CREATININE in the last 72 hours. DIAGNOSTIC TESTS:    Xr Pelvis (1-2 Views)    Result Date: 8/14/2019  EXAMINATION: ONE XRAY VIEW OF THE PELVIS 8/14/2019 7:06 am COMPARISON: None. HISTORY: ORDERING SYSTEM PROVIDED HISTORY: s/p fall TECHNOLOGIST PROVIDED HISTORY: s/p fall 68-year-old male status post fall FINDINGS: Single AP portable supine view of the pelvis. Urinary bladder filled with contrast. Both femoral heads project over the bilateral acetabula without gross femoral head dislocation or femoral head flattening. Pubic rami and iliac wings symmetric in appearance. Mild stool burden. No acute osseous abnormality evident. 1. Urinary bladder filled with contrast. 2. Mild stool burden. 3. No acute osseous abnormality evident. Ct Head Wo Contrast    Result Date: 8/14/2019  EXAMINATION: CT OF THE HEAD WITHOUT CONTRAST  8/14/2019 5:25 am TECHNIQUE: CT of the head was performed without the administration of intravenous contrast. Dose modulation, iterative reconstruction, and/or weight based adjustment of the mA/kV was utilized to reduce the radiation dose to as low as reasonably achievable. COMPARISON: 08/13/2019. HISTORY: ORDERING SYSTEM PROVIDED HISTORY: SUB-DURAL HEMORRHAGE TECHNOLOGIST PROVIDED HISTORY: Reason for Exam: Sub-dural hemorrhage; repeat CT assess for bleed progresssion Acuity: Unknown Type of Exam: Unknown Initial evaluation. FINDINGS: BRAIN/VENTRICLES: Right-sided subdural and subarachnoid blood.   While this appears slightly enlarged from the CT from 08/13/2019 at 16:18, this is similar in size to the prior CTA. There is a trace amount of subdural blood also seen along the left posterior sylvian fissure. No significant mass effect or midline shift. The gray-white differentiation appears maintained. There are areas of hypoattenuation in the periventricular and subcortical white matter, which is nonspecific, but may represent chronic microvasvular ischemic change. There is prominence of the ventricles and sulci due to global parenchymal volume loss. Atherosclerosis of the intracranial vasculature is noted. ORBITS: The visualized portion of the orbits demonstrate no acute abnormality. SINUSES: The visualized paranasal sinuses and mastoid air cells demonstrate no acute abnormality. SOFT TISSUES/SKULL:  No acute abnormality of the visualized skull or soft tissues. 1. Right-sided subdural and subarachnoid blood is seen. This is slightly increased from the outside noncontrast CT, but is similar to the recent CTA. 2. A trace amount of subdural blood is seen along the posterior left sylvian fissure. 3. No significant mass effect or midline shift. 4. Global parenchymal volume loss with chronic microvascular ischemic change. Xr Chest Portable    Result Date: 8/14/2019  EXAMINATION: ONE XRAY VIEW OF THE CHEST 8/14/2019 7:06 am COMPARISON: None HISTORY: ORDERING SYSTEM PROVIDED HISTORY: s/p fall TECHNOLOGIST PROVIDED HISTORY: s/p fall FINDINGS: Cervical spine collar obscures evaluation of lung apices. Cardiac and mediastinal contours are enlarged. Increased perihilar pulmonary opacities, with prominence of interstitial lung markings. Probable trace left pleural effusion. No evidence of right pleural effusion. No evidence of a pneumothorax within visualized chest. No gross evidence of acute osseous abnormalities. 1. Enlarged cardiomediastinal silhouette, with findings suggestive of pulmonary interstitial edema. Probable trace left pleural effusion. Medication List      CONTINUE taking these medications    ALPRAZolam 0.5 MG tablet  Commonly known as:  XANAX     latanoprost 0.005 % ophthalmic solution  Commonly known as:  XALATAN     PARoxetine 37.5 MG extended release tablet  Commonly known as:  PAXIL CR          Diet: No diet orders on file diet as tolerated  Activity: - Avoid strenuous activity or exercise until cleared during follow-up appointment  - No driving or operating heavy machinery while taking narcotics   Wound Care: Daily and as needed  Follow-up:   1. Follow-up Dr. Steffen Alexis, NS, in one week  2. Follow-up Dr. Bev Donovan, PMR, in one week. 3. Follow up in the next few weeks with PCP: No primary care provider on file.     Time Spent for discharge: 30 minutes    Boni ANGELES-CNP  8/19/2019, 9:52 AM